# Patient Record
Sex: FEMALE | Race: WHITE | NOT HISPANIC OR LATINO | URBAN - NONMETROPOLITAN AREA
[De-identification: names, ages, dates, MRNs, and addresses within clinical notes are randomized per-mention and may not be internally consistent; named-entity substitution may affect disease eponyms.]

---

## 2017-09-13 PROBLEM — H25.812: Noted: 2021-08-19

## 2017-09-13 PROBLEM — H52.4: Noted: 2017-09-13

## 2019-03-26 ENCOUNTER — IMPORTED ENCOUNTER (OUTPATIENT)
Dept: URBAN - NONMETROPOLITAN AREA CLINIC 1 | Facility: CLINIC | Age: 69
End: 2019-03-26

## 2019-03-26 PROCEDURE — 92014 COMPRE OPH EXAM EST PT 1/>: CPT

## 2019-03-26 PROCEDURE — 92250 FUNDUS PHOTOGRAPHY W/I&R: CPT

## 2019-03-26 NOTE — PATIENT DISCUSSION
NARROW ANGLE/BORDERLINE GLAUCOMA OU:-  Discussed diagnosis in detail w/ pt today-  Gonio done previously:  grade IV with light pigment OU. -  Optos done today stable findings OU -  OCT done previously OD shows Borderline Inferior NFL thinning and Os shows No NFL thinning -  VF done previously OD reliable with inferior / nasal defects and OS reliable with Borderline Inferior nasal dects  -  Previous PACH shows  and . -  Hx of noncompliance due to insurance issues but now has medicare and supplent-  IOP OD 14 and OS 13-  CDs noted at 0.8 OD and 0.7 OS. -  Patent PIs noted OU today. -  Continue Latanoprost OU QHS and Dorzolamide OU BID. -  Continue to monitor  Cataract OS:-  Discussed diagnosis in detail w/ pt today-  Moderate progression in size noted with BAT of 20/60+ last visit. Will repeat next time as well. -  Patient states no complaints with vision at this time. -  Signs/symptoms of progression discussed. -  Will continue to monitor for now P/C IOL with PCO OD:-  Discussed diagnosis in detail w/ pt today-  PCO progression noted OD but stable at this time without VA changes. BAT was 20/25+ last visit.  -  Patient is stable and doing well at this time with no complaints at this time-  Continue to monitor for nowHyperopia/astig/presby-  Discussed refractive status in detail w/ pt -  Monitor yearly or PRN; 's Notes: RM deferred 11/12/18DFE 9/10/2015Optos  3/26/19OCT disc 11/12/18VF  11/12/18Gonio  3/14/18PACH  2013 by Jennifer Bhat 9/13/17 by Kate Mejia

## 2019-11-14 ENCOUNTER — IMPORTED ENCOUNTER (OUTPATIENT)
Dept: URBAN - NONMETROPOLITAN AREA CLINIC 1 | Facility: CLINIC | Age: 69
End: 2019-11-14

## 2019-11-14 PROCEDURE — 92015 DETERMINE REFRACTIVE STATE: CPT

## 2019-11-14 PROCEDURE — 92014 COMPRE OPH EXAM EST PT 1/>: CPT

## 2019-11-14 PROCEDURE — 92133 CPTRZD OPH DX IMG PST SGM ON: CPT

## 2019-11-14 NOTE — PATIENT DISCUSSION
NARROW ANGLE/BORDERLINE GLAUCOMA OU:-  Discussed diagnosis in detail w/ pt today-  Gonio done previously:  grade IV with light pigment OU. -  Optos done previously stable findings OU -  OCT done today OD shows Borderline Inferior NFL thinning and OS shows Inferior Temporal NF Lthinning but poor scan due to blink -  VF done previously OD reliable with inferior / nasal defects and OS reliable with Borderline Inferior nasal dects  -  Previous PACH shows  and . -  Hx of noncompliance due to insurance issues but now has medicare and supplent-  IOP 13 OU-  CDs noted at 0.8 OD and 0.7 OS. -  Patent PIs noted OU today. -  Continue Latanoprost OU QHS and Dorzolamide OU BID. -  Continue to monitor  Cataract OS:-  Discussed diagnosis in detail w/ pt today-  Moderate progression in size noted with BAT of 20/60+ last visit. -  Patient states no complaints with vision at this time. -  Signs/symptoms of progression discussed.  -  Will continue to monitor for now P/C IOL with PCO OD:-  Discussed diagnosis in detail w/ pt today-  PCO progression noted OD but stable at this time -  Continue to monitor Hyperopia/astig/presby-  Discussed refractive status in detail w/ pt -  New glasses Rx given today -  Monitor yearly or PRN; 's Notes: RM deferred 11/12/18DFE 9/10/2015Optos  3/26/19OCT disc 11/14/19VF  11/12/18Gonio  3/14/18PACH  2013 by Stas Cano 9/13/17 by Valeriy Davis

## 2020-08-25 ENCOUNTER — IMPORTED ENCOUNTER (OUTPATIENT)
Dept: URBAN - NONMETROPOLITAN AREA CLINIC 1 | Facility: CLINIC | Age: 70
End: 2020-08-25

## 2020-08-25 PROCEDURE — 99214 OFFICE O/P EST MOD 30 MIN: CPT

## 2020-08-25 PROCEDURE — 92083 EXTENDED VISUAL FIELD XM: CPT

## 2020-08-25 NOTE — PATIENT DISCUSSION
NARROW ANGLE/BORDERLINE GLAUCOMA OU:- Discussed diagnosis in detail with patient - Gonio done previously:  grade IV with light pigment OU. - Optos done previously stable findings OU - OCT done previously OD shows Borderline Inferior NFL thinning and OS shows Inferior Temporal NF Lthinning but poor scan due to blink - VF done today OD shows Non Specific Defects and OS shows Good field and Normal   - PACH done previously shows  and . - Hx of noncompliance due to insurance issues but now has medicare and supplent- IOP OD 19 and OS 21- CDs noted at 0.8 OD and 0.7 OS. - Patent PIs noted OU today. - Continue Latanoprost OU QHS and Dorzolamide OU BID. - Pressures may be up due to change in drop from manufactor will try patient on Azopt.  Sample given today- Continue to monitor  - RTC 3 weeks F/U Cataract OS:- Discussed diagnosis in detail with patient- Discussed signs and symptoms of progression- Discussed UV protection- No treatment needed at this time - Continue to monitor P/C IOL with PCO OD:- Discussed diagnosis in detail with patient - PCO progression noted OD but stable at this time - Continue to monitor Hyperopia/astig/presby- Discussed diagnosis in detail with patient - Continue to monitor; 's Notes: RM deferred 11/12/18DFE 9/10/2015Optos  3/26/19OCT disc 11/14/19VF  11/12/18Gonio  3/14/18PACH  2013 by Marianne Porter 9/13/17 by Brittani Fairchild

## 2020-10-06 ENCOUNTER — IMPORTED ENCOUNTER (OUTPATIENT)
Dept: URBAN - NONMETROPOLITAN AREA CLINIC 1 | Facility: CLINIC | Age: 70
End: 2020-10-06

## 2020-10-06 PROCEDURE — 99214 OFFICE O/P EST MOD 30 MIN: CPT

## 2020-10-06 NOTE — PATIENT DISCUSSION
NARROW ANGLE/BORDERLINE GLAUCOMA OU:- Discussed diagnosis in detail with patient - Gonio done previously:  grade IV with light pigment OU. - Optos done previously stable findings OU - OCT done previously OD shows Borderline Inferior NFL thinning and OS shows Inferior Temporal NF Lthinning but poor scan due to blink - VF done previously OD shows Non Specific Defects and OS shows Good field and Normal   - PACH done previously shows  and . - Hx of noncompliance due to insurance issues but now has medicare and supplent- IOP OD 17 and OS 16- CDs noted at 0.8 OD and 0.7 OS. - Patent PIs noted OU today.  - D/C Dorzolamide OU BID due to pressures not coming down or change in maufactor - D/C Azopt due to cost over 300.00 with good RX - Start Betimol QAM OU sample given today - Continue Latanoprost OU QHS - Continue to monitor  - RTC 3 weeks F/U Cataract OS:- Discussed diagnosis in detail with patient- Discussed signs and symptoms of progression- Discussed UV protection- No treatment needed at this time - Continue to monitor P/C IOL with PCO OD:- Discussed diagnosis in detail with patient - PCO progression noted OD but stable at this time - Continue to monitor Hyperopia/astig/presby- Discussed diagnosis in detail with patient - Continue to monitor; 's Notes: RM deferred 11/12/18DFE 9/10/2015Optos  3/26/19OCT disc 11/14/19VF  11/12/18Gonio  3/14/18PACH  2013 by Milagros Lopez 9/13/17 by Bonnie Medina

## 2020-10-27 ENCOUNTER — IMPORTED ENCOUNTER (OUTPATIENT)
Dept: URBAN - NONMETROPOLITAN AREA CLINIC 1 | Facility: CLINIC | Age: 70
End: 2020-10-27

## 2020-10-27 PROCEDURE — 99213 OFFICE O/P EST LOW 20 MIN: CPT

## 2020-10-27 NOTE — PATIENT DISCUSSION
NARROW ANGLE/BORDERLINE GLAUCOMA OU:- Discussed diagnosis in detail with patient - Gonio done previously:  grade IV with light pigment OU. - Optos done previously stable findings OU - OCT done previously OD shows Borderline Inferior NFL thinning and OS shows Inferior Temporal NF Lthinning but poor scan due to blink - VF done previously OD shows Non Specific Defects and OS shows Good field and Normal   - PACH done previously shows  and . - Hx of noncompliance due to insurance issues but now has medicare and supplent- IOP OD 16 and OS 14- CDs noted at 0.8 OD and 0.7 OS. - Patent PIs noted OU today.  - D/C Dorzolamide OU BID due to pressures not coming down or change in maufactor - D/C Azopt due to cost over 300.00 with good RX - Continue Betimol QAM OU RX sent to pharmacy - Continue Latanoprost OU QHS - Continue to monitor  Cataract OS:- Discussed diagnosis in detail with patient- Discussed signs and symptoms of progression- Discussed UV protection- No treatment needed at this time - Continue to monitor P/C IOL with PCO OD:- Discussed diagnosis in detail with patient - PCO progression noted OD but stable at this time - Continue to monitor Hyperopia/astig/presby- Discussed diagnosis in detail with patient - Continue to monitor; 's Notes: RM deferred 11/12/18DFE 9/10/2015Optos  3/26/19OCT disc 11/14/19VF  11/12/18Gonio  3/14/18PACH  2013 by Ramiro Soriano 9/13/17 by Zay Cuadra

## 2021-04-13 ENCOUNTER — IMPORTED ENCOUNTER (OUTPATIENT)
Dept: URBAN - NONMETROPOLITAN AREA CLINIC 1 | Facility: CLINIC | Age: 71
End: 2021-04-13

## 2021-04-13 PROCEDURE — 92133 CPTRZD OPH DX IMG PST SGM ON: CPT

## 2021-04-13 PROCEDURE — 99214 OFFICE O/P EST MOD 30 MIN: CPT

## 2021-04-13 PROCEDURE — 92015 DETERMINE REFRACTIVE STATE: CPT

## 2021-04-13 NOTE — PATIENT DISCUSSION
NARROW ANGLE/BORDERLINE GLAUCOMA OU:- Discussed diagnosis in detail with patient - Gonio done previously:  grade IV with light pigment OU. - Optos done previously stable findings OU - OCT done today OD shows No NFL thinning and OS shows Borderline Temporal and Nasal NFL Thinnin - VF done previously OD shows Non Specific Defects and OS shows Good field and Normal   - PACH done previously shows  and .- IOP OD 15 and OS 15- CDs noted at 0.8 OD and 0.7 OS. - Patent PIs noted OU today.  - D/C Dorzolamide OU BID due to pressures not coming down or change in maufactor - D/C Azopt due to cost over 300.00 with good RX - Continue TimololMaleate0.5% QAM OU RX sent to pharmacy - Continue Latanoprost OU QHS RX sent to pharmacy- Continue to monitor  Cataract OS:- Discussed diagnosis in detail with patient- Discussed signs and symptoms of progression- Discussed UV protection- No treatment needed at this time - Continue to monitor P/C IOL with PCO OD:- Discussed diagnosis in detail with patient - PCO progression noted OD but stable at this time - Continue to monitor Hyperopia/astig/presby- Discussed diagnosis in detail with patient - Continue to monitor; 's Notes: RM deferred 11/12/18DFE 9/10/2015Optos  3/26/19OCT disc 4/13/21VF  11/12/18Gonio  3/14/18PACH  2013 by Ilia Miles 9/13/17 by Mahesh Resendiz

## 2021-08-19 ENCOUNTER — IMPORTED ENCOUNTER (OUTPATIENT)
Dept: URBAN - NONMETROPOLITAN AREA CLINIC 1 | Facility: CLINIC | Age: 71
End: 2021-08-19

## 2021-08-19 ENCOUNTER — PREPPED CHART (OUTPATIENT)
Dept: URBAN - NONMETROPOLITAN AREA CLINIC 1 | Facility: CLINIC | Age: 71
End: 2021-08-19

## 2021-08-19 PROCEDURE — 92083 EXTENDED VISUAL FIELD XM: CPT

## 2021-08-19 PROCEDURE — 99213 OFFICE O/P EST LOW 20 MIN: CPT

## 2021-08-19 NOTE — PATIENT DISCUSSION
NARROW ANGLE- Discussed diagnosis in detail with patient - Gonio done previously:  grade IV with light pigment OU. - Optos done previously stable findings OU - OCT done previously OD shows No NFL thinning and OS shows Borderline Temporal and Nasal NFL Thinnin - VF done today OD shows Good field and Normal and OS shows Borderline central Scotoma    - PACH done previously shows  and .- IOP OD 14 and OS 14- CDs noted at 0.8 OD and 0.7 OS. - Patent PIs noted OU today.  - D/C Dorzolamide OU BID due to pressures not coming down or change in maufactor - D/C Azopt due to cost over 300.00 with good RX - Continue Timolol-Maleate0.5% QAM OU RX sent to pharmacy - Continue Latanoprost OU QHS RX sent to pharmacy- Continue to monitor  Cataract OS:- Discussed diagnosis in detail with patient- Discussed signs and symptoms of progression- Discussed UV protection- No treatment needed at this time - Continue to monitor P/C IOL with PCO OD:- Discussed diagnosis in detail with patient - PCO progression noted OD but stable at this time - Continue to monitor Hyperopia/astig/presby- Discussed diagnosis in detail with patient - Continue to monitor; Dr's Notes: RM deferred 11/12/18DFE 9/10/2015Optos  3/26/19OCT disc 4/13/21VF  8/19/21Gonio  3/14/18PACH  2013 by Stas Cano 9/13/17 by Valeriy Davis

## 2022-03-26 ASSESSMENT — TONOMETRY
OS_IOP_MMHG: 14
OD_IOP_MMHG: 14

## 2022-03-26 ASSESSMENT — VISUAL ACUITY
OS_CC: 20/20
OD_CC: 20/20-1

## 2022-03-28 ENCOUNTER — FOLLOW UP (OUTPATIENT)
Dept: URBAN - NONMETROPOLITAN AREA CLINIC 1 | Facility: CLINIC | Age: 72
End: 2022-03-28

## 2022-03-28 DIAGNOSIS — H40.033: ICD-10-CM

## 2022-03-28 PROCEDURE — 92250 FUNDUS PHOTOGRAPHY W/I&R: CPT

## 2022-03-28 PROCEDURE — 99213 OFFICE O/P EST LOW 20 MIN: CPT

## 2022-03-28 ASSESSMENT — TONOMETRY
OS_IOP_MMHG: 15
OD_IOP_MMHG: 16

## 2022-03-28 ASSESSMENT — VISUAL ACUITY
OD_CC: 20/20-2
OS_CC: 20/20-1

## 2022-04-15 ASSESSMENT — PACHYMETRY
OD_CT_UM: 623; ADJ: THICK
OS_CT_UM: 603; ADJ: THICK
OS_CT_UM: 603; ADJ: THICK
OD_CT_UM: 623; ADJ: THICK
OS_CT_UM: 603; ADJ: THICK
OS_CT_UM: 603; ADJ: THICK
OD_CT_UM: 623; ADJ: THICK
OS_CT_UM: 603; ADJ: THICK
OS_CT_UM: 603; ADJ: THICK
OD_CT_UM: 623; ADJ: THICK
OD_CT_UM: 623; ADJ: THICK
OS_CT_UM: 603; ADJ: THICK

## 2022-04-15 ASSESSMENT — TONOMETRY
OD_IOP_MMHG: 15
OS_IOP_MMHG: 14
OS_IOP_MMHG: 15
OD_IOP_MMHG: 21
OS_IOP_MMHG: 22
OD_IOP_MMHG: 19
OS_IOP_MMHG: 14
OD_IOP_MMHG: 14
OS_IOP_MMHG: 13
OS_IOP_MMHG: 16
OS_IOP_MMHG: 21
OD_IOP_MMHG: 16
OD_IOP_MMHG: 14
OS_IOP_MMHG: 13
OD_IOP_MMHG: 17
OD_IOP_MMHG: 13

## 2022-04-15 ASSESSMENT — VISUAL ACUITY
OD_SC: 20/20-1
OS_SC: 20/20
OD_SC: 20/20-1
OD_CC: 20/22+2
OS_SC: 20/20
OS_CC: 20/20
OS_GLARE: 20/25-
OD_CC: 20/20
OD_GLARE: 20/20
OS_SC: 20/20
OD_SC: 20/20
OS_SC: 20/20
OS_GLARE: 20/20
OS_CC: 20/20
OD_GLARE: 20/25-
OS_CC: 20/25-2
OD_SC: 20/20
OD_CC: 20/20

## 2022-10-19 ENCOUNTER — FOLLOW UP (OUTPATIENT)
Dept: URBAN - NONMETROPOLITAN AREA CLINIC 1 | Facility: CLINIC | Age: 72
End: 2022-10-19

## 2022-10-19 DIAGNOSIS — H40.033: ICD-10-CM

## 2022-10-19 DIAGNOSIS — H25.811: ICD-10-CM

## 2022-10-19 DIAGNOSIS — H26.492: ICD-10-CM

## 2022-10-19 PROCEDURE — 99214 OFFICE O/P EST MOD 30 MIN: CPT

## 2022-10-19 PROCEDURE — 92133 CPTRZD OPH DX IMG PST SGM ON: CPT

## 2022-10-19 ASSESSMENT — VISUAL ACUITY
OD_PH: 20/30+1
OS_CC: 20/25+1
OD_CC: 20/40

## 2022-10-19 ASSESSMENT — TONOMETRY
OS_IOP_MMHG: 14
OD_IOP_MMHG: 14

## 2023-05-22 ENCOUNTER — ESTABLISHED PATIENT (OUTPATIENT)
Dept: URBAN - NONMETROPOLITAN AREA CLINIC 1 | Facility: CLINIC | Age: 73
End: 2023-05-22

## 2023-05-22 DIAGNOSIS — H25.811: ICD-10-CM

## 2023-05-22 DIAGNOSIS — H52.4: ICD-10-CM

## 2023-05-22 DIAGNOSIS — H40.033: ICD-10-CM

## 2023-05-22 DIAGNOSIS — H26.492: ICD-10-CM

## 2023-05-22 PROCEDURE — 92083 EXTENDED VISUAL FIELD XM: CPT

## 2023-05-22 PROCEDURE — 99214 OFFICE O/P EST MOD 30 MIN: CPT

## 2023-05-22 PROCEDURE — 92015 DETERMINE REFRACTIVE STATE: CPT

## 2023-05-22 ASSESSMENT — VISUAL ACUITY
OU_CC: 20/20-1
OD_CC: 20/30-2
OS_CC: 20/20-1

## 2023-05-22 ASSESSMENT — TONOMETRY
OD_IOP_MMHG: 15
OS_IOP_MMHG: 15

## 2023-06-28 ENCOUNTER — CONSULTATION/EVALUATION (OUTPATIENT)
Dept: URBAN - NONMETROPOLITAN AREA CLINIC 1 | Facility: CLINIC | Age: 73
End: 2023-06-28

## 2023-06-28 DIAGNOSIS — H26.492: ICD-10-CM

## 2023-06-28 PROCEDURE — 66821 AFTER CATARACT LASER SURGERY: CPT

## 2023-06-28 PROCEDURE — 99214 OFFICE O/P EST MOD 30 MIN: CPT

## 2023-06-28 ASSESSMENT — VISUAL ACUITY
OS_BAT: 20/50
OS_SC: 20/30

## 2023-06-28 ASSESSMENT — TONOMETRY: OS_IOP_MMHG: 14

## 2023-07-11 ENCOUNTER — POST-OP (OUTPATIENT)
Dept: URBAN - NONMETROPOLITAN AREA CLINIC 1 | Facility: CLINIC | Age: 73
End: 2023-07-11

## 2023-07-11 DIAGNOSIS — Z98.890: ICD-10-CM

## 2023-07-11 PROCEDURE — 99024 POSTOP FOLLOW-UP VISIT: CPT

## 2023-07-11 ASSESSMENT — VISUAL ACUITY
OD_SC: 20/20-1
OS_SC: 20/20-1

## 2023-07-11 ASSESSMENT — TONOMETRY
OD_IOP_MMHG: 14
OS_IOP_MMHG: 15

## 2023-10-13 ENCOUNTER — FOLLOW UP (OUTPATIENT)
Dept: URBAN - NONMETROPOLITAN AREA CLINIC 1 | Facility: CLINIC | Age: 73
End: 2023-10-13

## 2023-10-13 DIAGNOSIS — H40.033: ICD-10-CM

## 2023-10-13 PROCEDURE — 92133 CPTRZD OPH DX IMG PST SGM ON: CPT

## 2023-10-13 PROCEDURE — 99213 OFFICE O/P EST LOW 20 MIN: CPT

## 2023-10-13 ASSESSMENT — VISUAL ACUITY
OU_CC: 20/20
OD_CC: 20/20
OS_CC: 20/20

## 2023-10-13 ASSESSMENT — TONOMETRY
OD_IOP_MMHG: 14
OS_IOP_MMHG: 14

## 2024-04-15 ENCOUNTER — FOLLOW UP (OUTPATIENT)
Dept: URBAN - NONMETROPOLITAN AREA CLINIC 1 | Facility: CLINIC | Age: 74
End: 2024-04-15

## 2024-04-15 DIAGNOSIS — H40.1131: ICD-10-CM

## 2024-04-15 PROCEDURE — 92083 EXTENDED VISUAL FIELD XM: CPT

## 2024-04-15 PROCEDURE — 99214 OFFICE O/P EST MOD 30 MIN: CPT

## 2024-04-15 ASSESSMENT — TONOMETRY
OS_IOP_MMHG: 16
OD_IOP_MMHG: 15
OS_IOP_MMHG: 18
OD_IOP_MMHG: 18

## 2024-04-15 ASSESSMENT — VISUAL ACUITY
OS_SC: 20/22-1
OD_SC: 20/29-2
OU_SC: 20/20

## 2025-01-17 ENCOUNTER — FOLLOW UP (OUTPATIENT)
Age: 75
End: 2025-01-17

## 2025-01-17 DIAGNOSIS — H40.1131: ICD-10-CM

## 2025-01-17 PROCEDURE — 99213 OFFICE O/P EST LOW 20 MIN: CPT

## 2025-01-17 PROCEDURE — 92133 CPTRZD OPH DX IMG PST SGM ON: CPT

## 2025-01-24 ENCOUNTER — EMERGENCY VISIT (OUTPATIENT)
Age: 75
End: 2025-01-24

## 2025-01-24 DIAGNOSIS — H10.13: ICD-10-CM

## 2025-01-24 PROCEDURE — 99213 OFFICE O/P EST LOW 20 MIN: CPT

## 2025-01-24 RX ORDER — TOBRAMYCIN AND DEXAMETHASONE 1; 3 MG/ML; MG/ML: 1 SUSPENSION/ DROPS OPHTHALMIC

## 2025-03-19 ENCOUNTER — CONSULTATION/EVALUATION (OUTPATIENT)
Age: 75
End: 2025-03-19

## 2025-03-19 DIAGNOSIS — H25.811: ICD-10-CM

## 2025-03-19 DIAGNOSIS — Z96.1: ICD-10-CM

## 2025-03-19 PROCEDURE — 92136 OPHTHALMIC BIOMETRY: CPT

## 2025-03-19 PROCEDURE — 92025 CPTRIZED CORNEAL TOPOGRAPHY: CPT | Mod: NC

## 2025-03-19 PROCEDURE — 92134 CPTRZ OPH DX IMG PST SGM RTA: CPT | Mod: NC

## 2025-03-19 PROCEDURE — 99214 OFFICE O/P EST MOD 30 MIN: CPT

## 2025-03-27 ENCOUNTER — TECH ONLY (OUTPATIENT)
Age: 75
End: 2025-03-27

## 2025-03-27 DIAGNOSIS — H40.1132: ICD-10-CM

## 2025-03-27 PROCEDURE — 92083 EXTENDED VISUAL FIELD XM: CPT

## 2025-04-29 ENCOUNTER — PRE-OP/H&P (OUTPATIENT)
Age: 75
End: 2025-04-29

## 2025-04-29 VITALS
SYSTOLIC BLOOD PRESSURE: 138 MMHG | WEIGHT: 200 LBS | BODY MASS INDEX: 35.44 KG/M2 | HEIGHT: 63 IN | DIASTOLIC BLOOD PRESSURE: 76 MMHG | HEART RATE: 67 BPM

## 2025-04-29 DIAGNOSIS — K21.9: ICD-10-CM

## 2025-04-29 DIAGNOSIS — I10: ICD-10-CM

## 2025-04-29 DIAGNOSIS — Z01.818: ICD-10-CM

## 2025-04-29 DIAGNOSIS — E78.2: ICD-10-CM

## 2025-04-29 PROCEDURE — 99213 OFFICE O/P EST LOW 20 MIN: CPT

## 2025-05-06 ENCOUNTER — SURGERY/PROCEDURE (OUTPATIENT)
Age: 75
End: 2025-05-06

## 2025-05-06 DIAGNOSIS — H40.1112: ICD-10-CM

## 2025-05-06 DIAGNOSIS — H25.811: ICD-10-CM

## 2025-05-06 PROCEDURE — 66991 XCAPSL CTRC RMVL INSJ 1+: CPT

## 2025-05-07 ENCOUNTER — POST-OP (OUTPATIENT)
Age: 75
End: 2025-05-07

## 2025-05-07 DIAGNOSIS — Z98.41: ICD-10-CM

## 2025-05-07 PROCEDURE — 99024 POSTOP FOLLOW-UP VISIT: CPT

## 2025-05-14 ENCOUNTER — POST-OP (OUTPATIENT)
Age: 75
End: 2025-05-14

## 2025-05-14 DIAGNOSIS — Z98.41: ICD-10-CM

## 2025-05-14 PROCEDURE — 99024 POSTOP FOLLOW-UP VISIT: CPT

## 2025-06-04 ENCOUNTER — POST-OP (OUTPATIENT)
Age: 75
End: 2025-06-04

## 2025-06-04 DIAGNOSIS — H52.4: ICD-10-CM

## 2025-06-04 DIAGNOSIS — Z98.41: ICD-10-CM

## 2025-06-04 PROCEDURE — 99024 POSTOP FOLLOW-UP VISIT: CPT

## 2025-06-04 PROCEDURE — 92015 DETERMINE REFRACTIVE STATE: CPT
